# Patient Record
Sex: MALE | Race: WHITE | NOT HISPANIC OR LATINO | URBAN - METROPOLITAN AREA
[De-identification: names, ages, dates, MRNs, and addresses within clinical notes are randomized per-mention and may not be internally consistent; named-entity substitution may affect disease eponyms.]

---

## 2017-12-21 ENCOUNTER — APPOINTMENT (OUTPATIENT)
Dept: RADIOLOGY | Facility: MEDICAL CENTER | Age: 52
End: 2017-12-21
Attending: EMERGENCY MEDICINE
Payer: MEDICAID

## 2017-12-21 ENCOUNTER — HOSPITAL ENCOUNTER (EMERGENCY)
Facility: MEDICAL CENTER | Age: 52
End: 2017-12-22
Attending: EMERGENCY MEDICINE
Payer: MEDICAID

## 2017-12-21 DIAGNOSIS — R45.1 AGITATION: ICD-10-CM

## 2017-12-21 DIAGNOSIS — F41.8 SITUATIONAL ANXIETY: ICD-10-CM

## 2017-12-21 DIAGNOSIS — F17.200 TOBACCO DEPENDENCE: ICD-10-CM

## 2017-12-21 DIAGNOSIS — J44.1 ACUTE EXACERBATION OF CHRONIC OBSTRUCTIVE PULMONARY DISEASE (COPD) (HCC): ICD-10-CM

## 2017-12-21 LAB
ANION GAP SERPL CALC-SCNC: 12 MMOL/L (ref 0–11.9)
ANISOCYTOSIS BLD QL SMEAR: ABNORMAL
BASOPHILS # BLD AUTO: 0 % (ref 0–1.8)
BASOPHILS # BLD: 0 K/UL (ref 0–0.12)
BUN SERPL-MCNC: 10 MG/DL (ref 8–22)
CALCIUM SERPL-MCNC: 9.3 MG/DL (ref 8.5–10.5)
CHLORIDE SERPL-SCNC: 107 MMOL/L (ref 96–112)
CO2 SERPL-SCNC: 19 MMOL/L (ref 20–33)
CREAT SERPL-MCNC: 0.78 MG/DL (ref 0.5–1.4)
DEPRECATED D DIMER PPP IA-ACNC: <200 NG/ML(D-DU)
EKG IMPRESSION: NORMAL
EOSINOPHIL # BLD AUTO: 0.26 K/UL (ref 0–0.51)
EOSINOPHIL NFR BLD: 1.8 % (ref 0–6.9)
ERYTHROCYTE [DISTWIDTH] IN BLOOD BY AUTOMATED COUNT: 47.1 FL (ref 35.9–50)
GFR SERPL CREATININE-BSD FRML MDRD: >60 ML/MIN/1.73 M 2
GLUCOSE SERPL-MCNC: 209 MG/DL (ref 65–99)
HCT VFR BLD AUTO: 44.5 % (ref 42–52)
HGB BLD-MCNC: 15.9 G/DL (ref 14–18)
LYMPHOCYTES # BLD AUTO: 6.53 K/UL (ref 1–4.8)
LYMPHOCYTES NFR BLD: 46 % (ref 22–41)
MACROCYTES BLD QL SMEAR: ABNORMAL
MANUAL DIFF BLD: NORMAL
MCH RBC QN AUTO: 33.1 PG (ref 27–33)
MCHC RBC AUTO-ENTMCNC: 35.7 G/DL (ref 33.7–35.3)
MCV RBC AUTO: 92.5 FL (ref 81.4–97.8)
MONOCYTES # BLD AUTO: 1.28 K/UL (ref 0–0.85)
MONOCYTES NFR BLD AUTO: 9 % (ref 0–13.4)
MORPHOLOGY BLD-IMP: NORMAL
NEUTROPHILS # BLD AUTO: 6.13 K/UL (ref 1.82–7.42)
NEUTROPHILS NFR BLD: 43.2 % (ref 44–72)
NRBC # BLD AUTO: 0 K/UL
NRBC BLD-RTO: 0 /100 WBC
PLATELET # BLD AUTO: 254 K/UL (ref 164–446)
PLATELET BLD QL SMEAR: NORMAL
PMV BLD AUTO: 9.6 FL (ref 9–12.9)
POTASSIUM SERPL-SCNC: 3.7 MMOL/L (ref 3.6–5.5)
RBC # BLD AUTO: 4.81 M/UL (ref 4.7–6.1)
RBC BLD AUTO: PRESENT
SODIUM SERPL-SCNC: 138 MMOL/L (ref 135–145)
TROPONIN I SERPL-MCNC: 0.02 NG/ML (ref 0–0.04)
WBC # BLD AUTO: 14.2 K/UL (ref 4.8–10.8)

## 2017-12-21 PROCEDURE — 85027 COMPLETE CBC AUTOMATED: CPT

## 2017-12-21 PROCEDURE — 83880 ASSAY OF NATRIURETIC PEPTIDE: CPT

## 2017-12-21 PROCEDURE — 80048 BASIC METABOLIC PNL TOTAL CA: CPT

## 2017-12-21 PROCEDURE — 99285 EMERGENCY DEPT VISIT HI MDM: CPT

## 2017-12-21 PROCEDURE — 93005 ELECTROCARDIOGRAM TRACING: CPT | Performed by: EMERGENCY MEDICINE

## 2017-12-21 PROCEDURE — 36415 COLL VENOUS BLD VENIPUNCTURE: CPT

## 2017-12-21 PROCEDURE — 71010 DX-CHEST-PORTABLE (1 VIEW): CPT

## 2017-12-21 PROCEDURE — 700111 HCHG RX REV CODE 636 W/ 250 OVERRIDE (IP): Performed by: EMERGENCY MEDICINE

## 2017-12-21 PROCEDURE — 85007 BL SMEAR W/DIFF WBC COUNT: CPT

## 2017-12-21 PROCEDURE — 96374 THER/PROPH/DIAG INJ IV PUSH: CPT

## 2017-12-21 PROCEDURE — 85379 FIBRIN DEGRADATION QUANT: CPT

## 2017-12-21 PROCEDURE — 84484 ASSAY OF TROPONIN QUANT: CPT

## 2017-12-21 PROCEDURE — 304561 HCHG STAT O2

## 2017-12-21 RX ORDER — IPRATROPIUM BROMIDE AND ALBUTEROL SULFATE 2.5; .5 MG/3ML; MG/3ML
3 SOLUTION RESPIRATORY (INHALATION) ONCE
Status: COMPLETED | OUTPATIENT
Start: 2017-12-22 | End: 2017-12-22

## 2017-12-21 RX ORDER — PREDNISONE 20 MG/1
60 TABLET ORAL DAILY
Qty: 12 TAB | Refills: 0 | Status: SHIPPED | OUTPATIENT
Start: 2017-12-21 | End: 2017-12-25

## 2017-12-21 RX ORDER — ALBUTEROL SULFATE 90 UG/1
2 AEROSOL, METERED RESPIRATORY (INHALATION) EVERY 4 HOURS PRN
Qty: 1 INHALER | Refills: 0 | Status: SHIPPED | OUTPATIENT
Start: 2017-12-21

## 2017-12-21 RX ORDER — LORAZEPAM 2 MG/ML
1 INJECTION INTRAMUSCULAR ONCE
Status: COMPLETED | OUTPATIENT
Start: 2017-12-22 | End: 2017-12-21

## 2017-12-21 RX ORDER — PREDNISONE 20 MG/1
60 TABLET ORAL ONCE
Status: COMPLETED | OUTPATIENT
Start: 2017-12-21 | End: 2017-12-21

## 2017-12-21 RX ORDER — DOXYCYCLINE 100 MG/1
100 CAPSULE ORAL 2 TIMES DAILY
Qty: 14 CAP | Refills: 0 | Status: SHIPPED | OUTPATIENT
Start: 2017-12-21 | End: 2017-12-28

## 2017-12-21 RX ADMIN — LORAZEPAM 1 MG: 2 INJECTION, SOLUTION INTRAMUSCULAR; INTRAVENOUS at 23:57

## 2017-12-21 RX ADMIN — PREDNISONE 60 MG: 20 TABLET ORAL at 23:20

## 2017-12-22 VITALS
WEIGHT: 143 LBS | TEMPERATURE: 97.5 F | HEART RATE: 97 BPM | DIASTOLIC BLOOD PRESSURE: 93 MMHG | SYSTOLIC BLOOD PRESSURE: 133 MMHG | OXYGEN SATURATION: 94 % | BODY MASS INDEX: 18.95 KG/M2 | RESPIRATION RATE: 19 BRPM | HEIGHT: 73 IN

## 2017-12-22 LAB
BASE EXCESS BLDV CALC-SCNC: -4 MMOL/L
BNP SERPL-MCNC: 3 PG/ML (ref 0–100)
BODY TEMPERATURE: ABNORMAL CENTIGRADE
HCO3 BLDV-SCNC: 21 MMOL/L (ref 24–28)
PCO2 BLDV: 38.7 MMHG (ref 41–51)
PH BLDV: 7.36 [PH] (ref 7.31–7.45)
PO2 BLDV: 67.5 MMHG (ref 25–40)
SAO2 % BLDV: 90.5 %
TROPONIN I SERPL-MCNC: 0.01 NG/ML (ref 0–0.04)

## 2017-12-22 PROCEDURE — 82803 BLOOD GASES ANY COMBINATION: CPT

## 2017-12-22 PROCEDURE — 94640 AIRWAY INHALATION TREATMENT: CPT

## 2017-12-22 PROCEDURE — 700101 HCHG RX REV CODE 250: Performed by: EMERGENCY MEDICINE

## 2017-12-22 PROCEDURE — 84484 ASSAY OF TROPONIN QUANT: CPT

## 2017-12-22 RX ADMIN — IPRATROPIUM BROMIDE AND ALBUTEROL SULFATE 3 ML: .5; 3 SOLUTION RESPIRATORY (INHALATION) at 00:16

## 2017-12-22 ASSESSMENT — LIFESTYLE VARIABLES
TOTAL SCORE: 1
AVERAGE NUMBER OF DAYS PER WEEK YOU HAVE A DRINK CONTAINING ALCOHOL: 7
ON A TYPICAL DAY WHEN YOU DRINK ALCOHOL HOW MANY DRINKS DO YOU HAVE: 3
EVER_SMOKED: YES
TOTAL SCORE: 1
EVER FELT BAD OR GUILTY ABOUT YOUR DRINKING: NO
HAVE YOU EVER FELT YOU SHOULD CUT DOWN ON YOUR DRINKING: YES
DO YOU DRINK ALCOHOL: YES
EVER HAD A DRINK FIRST THING IN THE MORNING TO STEADY YOUR NERVES TO GET RID OF A HANGOVER: NO
CONSUMPTION TOTAL: POSITIVE
TOTAL SCORE: 1
HAVE PEOPLE ANNOYED YOU BY CRITICIZING YOUR DRINKING: NO
HOW MANY TIMES IN THE PAST YEAR HAVE YOU HAD 5 OR MORE DRINKS IN A DAY: 0

## 2017-12-22 ASSESSMENT — COPD QUESTIONNAIRES
HAVE YOU SMOKED AT LEAST 100 CIGARETTES IN YOUR ENTIRE LIFE: YES
DURING THE PAST 4 WEEKS HOW MUCH DID YOU FEEL SHORT OF BREATH: SOME OF THE TIME
DO YOU EVER COUGH UP ANY MUCUS OR PHLEGM?: YES, A FEW DAYS A WEEK OR MONTH
COPD SCREENING SCORE: 6

## 2017-12-22 NOTE — DISCHARGE INSTRUCTIONS
Fill your prescriptions and use them as directed. Your symptoms are most likely caused by a COPD exacerbation. In in addition to your prescribed medications, you need to keep working to completely quit smoking. Use the contact information we have provided to follow-up with primary care.     Chronic Obstructive Pulmonary Disease  Chronic obstructive pulmonary disease (COPD) is a common lung condition in which airflow from the lungs is limited. COPD is a general term that can be used to describe many different lung problems that limit airflow, including both chronic bronchitis and emphysema. If you have COPD, your lung function will probably never return to normal, but there are measures you can take to improve lung function and make yourself feel better.  CAUSES   · Smoking (common).  · Exposure to secondhand smoke.  · Genetic problems.  · Chronic inflammatory lung diseases or recurrent infections.  SYMPTOMS  · Shortness of breath, especially with physical activity.  · Deep, persistent (chronic) cough with a large amount of thick mucus.  · Wheezing.  · Rapid breaths (tachypnea).  · Gray or bluish discoloration (cyanosis) of the skin, especially in your fingers, toes, or lips.  · Fatigue.  · Weight loss.  · Frequent infections or episodes when breathing symptoms become much worse (exacerbations).  · Chest tightness.  DIAGNOSIS  Your health care provider will take a medical history and perform a physical examination to diagnose COPD. Additional tests for COPD may include:  · Lung (pulmonary) function tests.  · Chest X-ray.  · CT scan.  · Blood tests.  TREATMENT   Treatment for COPD may include:  · Inhaler and nebulizer medicines. These help manage the symptoms of COPD and make your breathing more comfortable.  · Supplemental oxygen. Supplemental oxygen is only helpful if you have a low oxygen level in your blood.  · Exercise and physical activity. These are beneficial for nearly all people with COPD.  · Lung surgery or  transplant.  · Nutrition therapy to gain weight, if you are underweight.  · Pulmonary rehabilitation. This may involve working with a team of health care providers and specialists, such as respiratory, occupational, and physical therapists.  HOME CARE INSTRUCTIONS  · Take all medicines (inhaled or pills) as directed by your health care provider.  · Avoid over-the-counter medicines or cough syrups that dry up your airway (such as antihistamines) and slow down the elimination of secretions unless instructed otherwise by your health care provider.  · If you are a smoker, the most important thing that you can do is stop smoking. Continuing to smoke will cause further lung damage and breathing trouble. Ask your health care provider for help with quitting smoking. He or she can direct you to community resources or hospitals that provide support.  · Avoid exposure to irritants such as smoke, chemicals, and fumes that aggravate your breathing.  · Use oxygen therapy and pulmonary rehabilitation if directed by your health care provider. If you require home oxygen therapy, ask your health care provider whether you should purchase a pulse oximeter to measure your oxygen level at home.  · Avoid contact with individuals who have a contagious illness.  · Avoid extreme temperature and humidity changes.  · Eat healthy foods. Eating smaller, more frequent meals and resting before meals may help you maintain your strength.  · Stay active, but balance activity with periods of rest. Exercise and physical activity will help you maintain your ability to do things you want to do.  · Preventing infection and hospitalization is very important when you have COPD. Make sure to receive all the vaccines your health care provider recommends, especially the pneumococcal and influenza vaccines. Ask your health care provider whether you need a pneumonia vaccine.  · Learn and use relaxation techniques to manage stress.  · Learn and use controlled  breathing techniques as directed by your health care provider. Controlled breathing techniques include:  ¨ Pursed lip breathing. Start by breathing in (inhaling) through your nose for 1 second. Then, purse your lips as if you were going to whistle and breathe out (exhale) through the pursed lips for 2 seconds.  ¨ Diaphragmatic breathing. Start by putting one hand on your abdomen just above your waist. Inhale slowly through your nose. The hand on your abdomen should move out. Then purse your lips and exhale slowly. You should be able to feel the hand on your abdomen moving in as you exhale.  · Learn and use controlled coughing to clear mucus from your lungs. Controlled coughing is a series of short, progressive coughs. The steps of controlled coughing are:  1. Lean your head slightly forward.  2. Breathe in deeply using diaphragmatic breathing.  3. Try to hold your breath for 3 seconds.  4. Keep your mouth slightly open while coughing twice.  5. Spit any mucus out into a tissue.  6. Rest and repeat the steps once or twice as needed.  SEEK MEDICAL CARE IF:  · You are coughing up more mucus than usual.  · There is a change in the color or thickness of your mucus.  · Your breathing is more labored than usual.  · Your breathing is faster than usual.  SEEK IMMEDIATE MEDICAL CARE IF:  · You have shortness of breath while you are resting.  · You have shortness of breath that prevents you from:  ¨ Being able to talk.  ¨ Performing your usual physical activities.  · You have chest pain lasting longer than 5 minutes.  · Your skin color is more cyanotic than usual.  · You measure low oxygen saturations for longer than 5 minutes with a pulse oximeter.  MAKE SURE YOU:  · Understand these instructions.  · Will watch your condition.  · Will get help right away if you are not doing well or get worse.     This information is not intended to replace advice given to you by your health care provider. Make sure you discuss any questions  you have with your health care provider.     Document Released: 09/27/2006 Document Revised: 01/08/2016 Document Reviewed: 08/14/2014  Elsevier Interactive Patient Education ©2016 [employer]

## 2017-12-22 NOTE — ED NOTES
Pt given discharge instructions and prescriptions, reviewed, all questions answered. Verbalizes understanding of prescriptions and to complete antibiotic course, as well as self care and follow up instructions. VS stable, WOB improved. PIV dc'd. Pt ambulatory independently, steady on feet, to ED exit.

## 2017-12-22 NOTE — ED NOTES
"Chief Complaint   Patient presents with   • Shortness of Breath   • Syncope     /93   Pulse 100   Temp 36.4 °C (97.5 °F)   Resp (!) 24   Ht 1.854 m (6' 1\")   Wt 64.9 kg (143 lb)   BMI 18.87 kg/m²     BIB [facility]. Pt woke family member up at home because he was having SOB, then had syncopal episode. Family told EMS thought maybe sz, pt denies hx of seizures. A/O x4 for EMS and on arrival. Given x1 albuterol and x1 duoneb with EMS. However, pt thinks may be allergic to albuterol as he has been taking it for a few months now and feels worsened breathing.   "

## 2017-12-22 NOTE — ED PROVIDER NOTES
ED Provider Note    Scribed for Ricky Sterling M.D. by Ricky Sterling. 12/21/2017,  10:45 PM.    CHIEF COMPLAINT  Chief Complaint   Patient presents with   • Shortness of Breath   • Syncope       HPI  Joseph Garsia is a 52 y.o. male who presents [facility] the [facility] DepartmentBrought in by EMS after a syncopal versus near syncopal episode which was witnessed by family. The patient woke a family member because he was feeling short of breath, and then had the witnessed episode. EMS was called, and they reported that he was fully alert and oriented on arrival. He was given an albuterol treatment and a DuoNeb treatment in route. The patient expresses concern about possibly being allergic to albuterol, since he reports he's been taking it for a few months, and feels his breathing has been getting steadily worse. However, he says that he feels much better after the medications given in the ambulance, which was albuterol and DuoNeb. The patient's family member is not here. He says that he remember waking up in the ambulance. He has a history of non-oxygen dependent COPD, but continues to smoke. He has normal room air oxygen saturation. He is somewhat emotionally agitated, and admits to moderate drinking tonight. He reports he is down to 4 cigarettes per day from 2 packs. He denies other drug use. He has not had fevers or chills or nausea or vomiting. As above, he feels his breathing has been slowly progressively worse over recent weeks. For the most part he is speaking in a clear voice, but then describes the way he thinks albuterol is making his voice change and become hoarse, at which point his voice becomes hoarse, before returning back to normal as we move onto different topics. When he returns to the way he thinks he is allergic to albuterol, his voice becomes worse again.      REVIEW OF SYSTEMS  See HPI for further details. All other systems are negative.     PAST MEDICAL HISTORY   has a past medical history of  "Chronic obstructive pulmonary disease (CMS-HCC).COPD, alcohol abuse, tobacco dependence.    SOCIAL HISTORY  Social History     Social History Main Topics   • Smoking status: Current Every Day Smoker     Packs/day: 0.25     Types: Cigarettes   • Smokeless tobacco: Never Used   • Alcohol use Yes      Comment: 2 beers, 1/2 pint vodka today   • Drug use: No   • Sexual activity: Not on file     History   Drug Use No       SURGICAL HISTORY  patient denies any surgical history    CURRENT MEDICATIONS  Home Medications    **Home medications have not yet been reviewed for this encounter**         ALLERGIES  No Known Allergies    PHYSICAL EXAM  VITAL SIGNS: /93   Pulse 97   Temp 36.4 °C (97.5 °F)   Resp 19   Ht 1.854 m (6' 1\")   Wt 64.9 kg (143 lb)   SpO2 94%   BMI 18.87 kg/m²   Pulse ox interpretation: I interpret this pulse ox as normal.  Constitutional: Alert, Anxious, agitated.  HENT: No signs of trauma, Bilateral external ears normal, Nose normal.   Eyes: Conjunctiva normal, Non-icteric.   Neck: Normal range of motion, Supple, No stridor.   Lymphatic: No lymphadenopathy noted.   Cardiovascular: Regular rate and rhythm, no murmurs.   Thorax & Lungs: Normal breath sounds, No respiratory distress, No wheezing, No chest tenderness.   Abdomen: Bowel sounds normal, Soft, No tenderness, No masses, No pulsatile masses. No peritoneal signs.  Skin: Warm, Dry, No erythema, No rash.   Extremities: Intact distal pulses, No edema, No cyanosis.  Musculoskeletal: Good range of motion in all major joints. No or major deformities noted.   Neurologic: Alert , Normal motor function, Normal sensory function, No focal deficits noted.   Psychiatric: Affect anxious, agitated, Judgment normal, Mood normal.     DIAGNOSTIC STUDIES / PROCEDURES      LABS  Labs Reviewed   CBC WITH DIFFERENTIAL - Abnormal; Notable for the following:        Result Value    WBC 14.2 (*)     MCH 33.1 (*)     MCHC 35.7 (*)     Neutrophils-Polys 43.20 (*)     " Lymphocytes 46.00 (*)     Lymphs (Absolute) 6.53 (*)     Monos (Absolute) 1.28 (*)     All other components within normal limits    Narrative:     1. Age less then 50  2. Heart reate less then 100  3. 02 sat > 94%  4. No prior history of DVT or PE  5. No recent trauma or surgery (2 weeks)  6. No hemoptisis.  7. No  or HRP  8. No un-lateral leg swelling.   BASIC METABOLIC PANEL - Abnormal; Notable for the following:     Co2 19 (*)     Glucose 209 (*)     Anion Gap 12.0 (*)     All other components within normal limits    Narrative:     1. Age less then 50  2. Heart reate less then 100  3. 02 sat > 94%  4. No prior history of DVT or PE  5. No recent trauma or surgery (2 weeks)  6. No hemoptisis.  7. No  or HRP  8. No un-lateral leg swelling.   VENOUS BLOOD GAS - Abnormal; Notable for the following:     Venous Bg Pco2 38.7 (*)     Venous Bg Po2 67.5 (*)     Venous Bg Hco3 21 (*)     All other components within normal limits   BTYPE NATRIURETIC PEPTIDE    Narrative:     1. Age less then 50  2. Heart reate less then 100  3. 02 sat > 94%  4. No prior history of DVT or PE  5. No recent trauma or surgery (2 weeks)  6. No hemoptisis.  7. No  or HRP  8. No un-lateral leg swelling.   TROPONIN    Narrative:     1. Age less then 50  2. Heart reate less then 100  3. 02 sat > 94%  4. No prior history of DVT or PE  5. No recent trauma or surgery (2 weeks)  6. No hemoptisis.  7. No  or HRP  8. No un-lateral leg swelling.   TROPONIN   D-DIMER    Narrative:     1. Age less then 50  2. Heart reate less then 100  3. 02 sat > 94%  4. No prior history of DVT or PE  5. No recent trauma or surgery (2 weeks)  6. No hemoptisis.  7. No  or HRP  8. No un-lateral leg swelling.   ESTIMATED GFR    Narrative:     1. Age less then 50  2. Heart reate less then 100  3. 02 sat > 94%  4. No prior history of DVT or PE  5. No recent trauma or surgery (2 weeks)  6. No hemoptisis.  7. No  or HRP  8. No un-lateral leg swelling.    DIFFERENTIAL MANUAL    Narrative:     1. Age less then 50  2. Heart reate less then 100  3. 02 sat > 94%  4. No prior history of DVT or PE  5. No recent trauma or surgery (2 weeks)  6. No hemoptisis.  7. No  or HRP  8. No un-lateral leg swelling.   PERIPHERAL SMEAR REVIEW    Narrative:     1. Age less then 50  2. Heart reate less then 100  3. 02 sat > 94%  4. No prior history of DVT or PE  5. No recent trauma or surgery (2 weeks)  6. No hemoptisis.  7. No  or HRP  8. No un-lateral leg swelling.   PLATELET ESTIMATE    Narrative:     1. Age less then 50  2. Heart reate less then 100  3. 02 sat > 94%  4. No prior history of DVT or PE  5. No recent trauma or surgery (2 weeks)  6. No hemoptisis.  7. No  or HRP  8. No un-lateral leg swelling.   MORPHOLOGY    Narrative:     1. Age less then 50  2. Heart reate less then 100  3. 02 sat > 94%  4. No prior history of DVT or PE  5. No recent trauma or surgery (2 weeks)  6. No hemoptisis.  7. No  or HRP  8. No un-lateral leg swelling.     All labs reviewed by me.    RADIOLOGY  DX-CHEST-PORTABLE (1 VIEW)   Final Result      No acute cardiopulmonary disease.        The radiologist's interpretation of all radiological studies have been reviewed by me.    COURSE & MEDICAL DECISION MAKING  Nursing notes, VS, PMSFHx reviewed in chart.     10:45 PM Patient seen and examined at bedside. He arrives with a syncopal episode, acute on worsening chronic shortness of breath, tachypnea, and tachycardia.  He cannot be ruled out for PE by the PERC criteria, and a d-dimer is indicated. Differential diagnosis includes but is not limited to PE, pneumonia, asthma/reactive airway disease, ACS, URI. Ordered for laboratory tests including d-dimer, and a chest x-ray to evaluate. Patient will be treated with oral prednisone for his symptoms. He states that steroids have been helpful in the past, and would be appropriate for a COPD exacerbation.    11:54 PM The patient's bedside, nurse  "called to let me note, the patient is \"freaking out,\", and stating he can't breathe.  His oxygen saturation at that time on room air is 97%.  His nurse reported mild wheezing.  I ordered a milligram of Ativan and a breathing treatment. This patient's labs show a mild metabolic acidosis, and nonspecific mild leukocytosis, and a normal d-dimer.  I think he is experiencing a mild COPD exacerbation, without hypoxia.  After the breathing treatment, I think he is Probably safe and appropriate for discharge with doxycycline and a course of prednisone, but I have also added a venous blood gas to ensure that the patient's agitation is not secondary to severe hypercarbia.    12:12 AM after Ativan, the patient is sleeping comfortably without any respiratory difficulty, and with normal oxygen saturation.    1:09 AM this patient's remaining laboratory tests were unremarkable. His venous blood gas is reassuring in terms of any evidence of hypercarbia as a cause of his agitation. He has continued to sleep comfortably. I think anxiety was clearly playing a factor in his subjective shortness of breath with normal oxygen saturation, despite normal wheezing. His respiratory exam has improved, and he has oxygen saturation of 95% while asleep. I refilled his medications, added prednisone and doxycycline as above, and the patient is discharged in improved condition.       The patient will return for new or worsening symptoms and is stable at the time of discharge.    The patient is referred to a primary physician for blood pressure management, diabetic screening, and for all other preventative health concerns.    DISPOSITION:  Patient will be discharged home in stable condition.    FOLLOW UP:  [address] [address]  [phone]        [facility]  [address]  [place] [address]  [phone]          [facility]  [address]  [place]  [phone]            OUTPATIENT " MEDICATIONS:  Discharge Medication List as of 12/22/2017  1:12 AM      START taking these medications    Details   predniSONE (DELTASONE) 20 MG Tab Take 3 Tabs by mouth every day for 4 days., Disp-12 Tab, R-0, Print Rx Paper      doxycycline (MONODOX) 100 MG capsule Take 1 Cap by mouth 2 times a day for 7 days., Disp-14 Cap, R-0, Print Rx Paper      beclomethasone (QVAR) 40 MCG/ACT inhaler Inhale 1 Puff by mouth 2 Times a Day., Disp-1 Inhaler, R-0, Print Rx Paper      albuterol 108 (90 Base) MCG/ACT Aero Soln inhalation aerosol Inhale 2 Puffs by mouth every four hours as needed for Shortness of Breath., Disp-1 Inhaler, R-0, Print Rx Paper               FINAL IMPRESSION  1. Acute exacerbation of chronic obstructive pulmonary disease (COPD) (CMS-[facility])    2. Agitation    3. Situational anxiety    4. Tobacco dependence

## 2017-12-22 NOTE — ED NOTES
"Break RN: Patient anxious and screaming for help stating that \"I can't breath\". All vital signs were stable. Patient assessed, lungs wheezy. Patient then began to rip off his monitoring equipment stating that \"I am sick of this shit\". ERP updated, orders placed.     "